# Patient Record
Sex: MALE | Race: BLACK OR AFRICAN AMERICAN | NOT HISPANIC OR LATINO | Employment: OTHER | URBAN - METROPOLITAN AREA
[De-identification: names, ages, dates, MRNs, and addresses within clinical notes are randomized per-mention and may not be internally consistent; named-entity substitution may affect disease eponyms.]

---

## 2017-04-04 ENCOUNTER — LAB VISIT (OUTPATIENT)
Dept: LAB | Facility: HOSPITAL | Age: 77
End: 2017-04-04
Attending: UROLOGY
Payer: MEDICARE

## 2017-04-04 DIAGNOSIS — Z85.46 HISTORY OF PROSTATE CANCER: ICD-10-CM

## 2017-04-04 PROCEDURE — 84153 ASSAY OF PSA TOTAL: CPT

## 2017-04-04 PROCEDURE — 36415 COLL VENOUS BLD VENIPUNCTURE: CPT | Mod: PO

## 2017-04-04 PROCEDURE — 84403 ASSAY OF TOTAL TESTOSTERONE: CPT

## 2017-04-05 LAB
COMPLEXED PSA SERPL-MCNC: 0.34 NG/ML
TESTOST SERPL-MCNC: 17 NG/DL

## 2017-04-11 ENCOUNTER — OFFICE VISIT (OUTPATIENT)
Dept: UROLOGY | Facility: CLINIC | Age: 77
End: 2017-04-11
Payer: MEDICARE

## 2017-04-11 VITALS
HEIGHT: 69 IN | SYSTOLIC BLOOD PRESSURE: 110 MMHG | DIASTOLIC BLOOD PRESSURE: 78 MMHG | HEART RATE: 62 BPM | BODY MASS INDEX: 34.87 KG/M2 | WEIGHT: 235.44 LBS

## 2017-04-11 DIAGNOSIS — C61 PROSTATE CANCER: Primary | ICD-10-CM

## 2017-04-11 DIAGNOSIS — R35.1 NOCTURIA MORE THAN TWICE PER NIGHT: ICD-10-CM

## 2017-04-11 DIAGNOSIS — N52.9 ED (ERECTILE DYSFUNCTION) OF ORGANIC ORIGIN: ICD-10-CM

## 2017-04-11 LAB
BILIRUB SERPL-MCNC: NORMAL MG/DL
BLOOD URINE, POC: NORMAL
COLOR, POC UA: YELLOW
GLUCOSE UR QL STRIP: NORMAL
KETONES UR QL STRIP: NORMAL
LEUKOCYTE ESTERASE URINE, POC: NORMAL
NITRITE, POC UA: NORMAL
PH, POC UA: 7
PROTEIN, POC: NORMAL
SPECIFIC GRAVITY, POC UA: 1000
UROBILINOGEN, POC UA: NORMAL

## 2017-04-11 PROCEDURE — 3078F DIAST BP <80 MM HG: CPT | Mod: S$GLB,,, | Performed by: UROLOGY

## 2017-04-11 PROCEDURE — 3074F SYST BP LT 130 MM HG: CPT | Mod: S$GLB,,, | Performed by: UROLOGY

## 2017-04-11 PROCEDURE — 99213 OFFICE O/P EST LOW 20 MIN: CPT | Mod: 25,S$GLB,, | Performed by: UROLOGY

## 2017-04-11 PROCEDURE — 99999 PR PBB SHADOW E&M-EST. PATIENT-LVL III: CPT | Mod: PBBFAC,,, | Performed by: UROLOGY

## 2017-04-11 PROCEDURE — 81001 URINALYSIS AUTO W/SCOPE: CPT | Mod: S$GLB,,, | Performed by: UROLOGY

## 2017-04-11 PROCEDURE — 1160F RVW MEDS BY RX/DR IN RCRD: CPT | Mod: S$GLB,,, | Performed by: UROLOGY

## 2017-04-11 PROCEDURE — 1159F MED LIST DOCD IN RCRD: CPT | Mod: S$GLB,,, | Performed by: UROLOGY

## 2017-04-11 PROCEDURE — 1126F AMNT PAIN NOTED NONE PRSNT: CPT | Mod: S$GLB,,, | Performed by: UROLOGY

## 2017-04-11 PROCEDURE — 1157F ADVNC CARE PLAN IN RCRD: CPT | Mod: S$GLB,,, | Performed by: UROLOGY

## 2017-04-11 NOTE — MR AVS SNAPSHOT
[facility]  [address]  [place] [address]  Phone: [phone]                  Cr Ferrell   2017 3:45 PM   Office Visit    Description:  Male : 1940   Provider:  DUARTE Holman MD   Department:  [facility]           Reason for Visit     Prostate Cancer           Diagnoses this Visit        Comments    Prostate cancer    -  Primary     Nocturia more than twice per night         ED (erectile dysfunction) of organic origin                To Do List           Future Appointments        Provider Department Dept Phone    2017 3:45 PM DUARTE Holman MD [facility] [phone]      Goals (5 Years of Data)              9/27/14    7/12/14    3/8/14    HEMOGLOBIN A1C < 7.0   9.0  7.1  7.7    Related Problems    Uncontrolled type 2 diabetes with stage 3 chronic kidney disease GFR 30-59      Follow-Up and Disposition     Return in about 3 months (around 2017) for Review PSA, Follow up, Possible Trelstar Injection.      [facility]     [facility] Nurse Care Line -  Assistance  Unless otherwise directed by your provider, please contact [facility], our nurse care line that is available for  assistance.     Registered nurses in the [facility] provide: appointment scheduling, clinical advisement, health education, and other advisory services.  Call: [phone] (toll free)               Medications           Message regarding Medications     Verify the changes and/or additions to your medication regime listed below are the same as discussed with your clinician today.  If any of these changes or additions are incorrect, please notify your healthcare provider.        STOP taking these medications     hydrocodone-acetaminophen 10-325mg (NORCO)  mg Tab Take 1 tablet by mouth 2 (two) times daily as needed.           Verify that the below list of medications is an accurate representation of the medications you are  "currently taking.  If none reported, the list may be blank. If incorrect, please contact your healthcare provider. Carry this list with you in case of emergency.           Current Medications     felodipine (PLENDIL) 10 MG 24 hr tablet Take 1 tablet (10 mg total) by mouth once daily.    gabapentin (NEURONTIN) 300 MG capsule take 1 capsule by mouth three times a day    glipiZIDE (GLUCOTROL) 10 MG tablet take 1 and 1/2 tablets by mouth twice a day before meals    hydrochlorothiazide (HYDRODIURIL) 25 MG tablet Take 1 tablet (25 mg total) by mouth once daily.    latanoprost 0.005 % ophthalmic solution     lisinopril (PRINIVIL,ZESTRIL) 40 MG tablet Take 1 tablet (40 mg total) by mouth once daily.    ranitidine (ZANTAC) 150 MG tablet Take 1 tablet (150 mg total) by mouth 2 (two) times daily.    simvastatin (ZOCOR) 80 MG tablet Take 1 tablet (80 mg total) by mouth every evening.    spironolactone (ALDACTONE) 50 MG tablet Take 1 tablet (50 mg total) by mouth 2 (two) times daily.    tamsulosin (FLOMAX) 0.4 mg Cp24 take 1 capsule by mouth once daily    triptorelin pamoate 11.25 mg SusR Inject 1 Syringe into the muscle every 3 (three) months.           Clinical Reference Information           Your Vitals Were     BP Pulse Height Weight BMI    110/78 62 5' 9" (1.753 m) 106.8 kg (235 lb 7.2 oz) 34.77 kg/m2      Blood Pressure          Most Recent Value    BP  110/78      Allergies as of 4/11/2017     No Known Allergies      Immunizations Administered on Date of Encounter - 4/11/2017     None      Orders Placed During Today's Visit      Normal Orders This Visit    POCT urinalysis, dipstick or tablet reag     Future Labs/Procedures Expected by Expires    Testosterone  4/11/2017 6/10/2018    Prostate Specific Antigen, Diagnostic  7/9/2017 4/11/2018      [facility] Sign-Up     Activating your [facility] account is as easy as 1-2-3!     1) Visit [email or web address], select Sign Up Now, enter this activation code and your date of birth, then " select Next.  [email or web address]  Expires: 5/26/2017  3:37 PM      2) Create a username and password to use when you visit [facility] in the future and select a security question in case you lose your password and select Next.    3) Enter your e-mail address and click Sign Up!    Additional Information  If you have questions, please e-mail Notify [email or web address] or call [phone] to talk to our [employer] staff. Remember, [facility] is NOT to be used for urgent needs. For medical emergencies, dial 911.         Language Assistance Services     ATTENTION: Language assistance services are available, free of charge. Please call [phone].      ATENCIÓN: Si prosper blum, tiene a pepper disposición servicios gratuitos de asistencia lingüística. Llame al [phone].     [facility] Ý: N?u b?n nói Ti?ng Vi?t, có các d?ch v? h? tr? ngôn ng? mi?n phí dành cho b?n. G?i s? [phone].         [facility] complies with applicable Federal civil rights laws and does not discriminate on the basis of race, color, national origin, age, disability, or sex.

## 2017-04-11 NOTE — PROGRESS NOTES
Subjective:       Patient ID: Cr Ferrell is a 76 y.o. male who was last seen in this office 12/20/2016    Chief Complaint:   Chief Complaint   Patient presents with    Prostate Cancer     3 month f/u with labs an trelstar         History of Present Illness  Prostate Cancer  He underwent brachytherapy in 2005.  His PSA was low for a few years but then started to rise.   In May 2014 his PSA had risen to 9.2 and he was restarted on the Zoladex.  He now gets intermittent injections.  His last Trelstar 22.5 mg injection was  September 2016.   He feels okay.  He denies hematuria, pain, or weight loss.  He has ED and is managed with either Viagra, Levitra, or Cilais.       ACTIVE MEDICAL ISSUES:  Patient Active Problem List   Diagnosis    Hypertension    Uncontrolled type 2 diabetes with stage 3 chronic kidney disease GFR 30-59    BPH (benign prostatic hyperplasia)    Hyperlipidemia LDL goal < 100    Obstructive sleep apnea on CPAP    Obesity     Chronic low back pain    Lumbar radiculopathy    Chronic kidney disease, stage 3, mod decreased GFR    Type 2 diabetes, uncontrolled, with retinopathy    AR (allergic rhinitis)    Uncontrolled type 2 diabetes mellitus with proteinuria or microalbuminuria       ALLERGIES AND MEDICATIONS: updated and reviewed.  Review of patient's allergies indicates:  No Known Allergies  Current Outpatient Prescriptions   Medication Sig    felodipine (PLENDIL) 10 MG 24 hr tablet Take 1 tablet (10 mg total) by mouth once daily.    gabapentin (NEURONTIN) 300 MG capsule take 1 capsule by mouth three times a day    glipiZIDE (GLUCOTROL) 10 MG tablet take 1 and 1/2 tablets by mouth twice a day before meals    hydrochlorothiazide (HYDRODIURIL) 25 MG tablet Take 1 tablet (25 mg total) by mouth once daily.    latanoprost 0.005 % ophthalmic solution     lisinopril (PRINIVIL,ZESTRIL) 40 MG tablet Take 1 tablet (40 mg total) by mouth once daily.    ranitidine (ZANTAC) 150 MG tablet Take 1  "tablet (150 mg total) by mouth 2 (two) times daily.    simvastatin (ZOCOR) 80 MG tablet Take 1 tablet (80 mg total) by mouth every evening.    spironolactone (ALDACTONE) 50 MG tablet Take 1 tablet (50 mg total) by mouth 2 (two) times daily.    tamsulosin (FLOMAX) 0.4 mg Cp24 take 1 capsule by mouth once daily    triptorelin pamoate 11.25 mg SusR Inject 1 Syringe into the muscle every 3 (three) months.     No current facility-administered medications for this visit.        Review of Systems   Constitutional: Negative for activity change, fatigue, fever and unexpected weight change.   HENT: Negative for congestion.    Eyes: Negative for redness.   Respiratory: Negative for chest tightness and shortness of breath.    Cardiovascular: Negative for chest pain and leg swelling.   Gastrointestinal: Negative for abdominal pain, constipation, diarrhea, nausea and vomiting.   Genitourinary: Negative for dysuria, flank pain, frequency, hematuria, penile pain, penile swelling, scrotal swelling, testicular pain and urgency.   Musculoskeletal: Negative for arthralgias and back pain.   Neurological: Negative for dizziness and light-headedness.   Psychiatric/Behavioral: Negative for behavioral problems and confusion. The patient is not nervous/anxious.    All other systems reviewed and are negative.      Objective:      Vitals:    04/11/17 1531   BP: 110/78   Pulse: 62   Weight: 106.8 kg (235 lb 7.2 oz)   Height: 5' 9" (1.753 m)     Physical Exam   Nursing note and vitals reviewed.  Constitutional: He is oriented to person, place, and time. He appears well-developed and well-nourished.   HENT:   Head: Normocephalic.   Eyes: Conjunctivae are normal.   Neck: Normal range of motion. Neck supple. No tracheal deviation present. No thyromegaly present.   Cardiovascular: Normal rate and normal heart sounds.    Pulmonary/Chest: Effort normal and breath sounds normal. No respiratory distress. He has no wheezes.   Abdominal: Soft. Bowel " sounds are normal. There is no hepatosplenomegaly. There is no tenderness. There is no rebound and no CVA tenderness. No hernia.   Musculoskeletal: Normal range of motion. He exhibits no edema or tenderness.   Lymphadenopathy:     He has no cervical adenopathy.   Neurological: He is alert and oriented to person, place, and time.   Skin: Skin is warm and dry. No rash noted. No erythema.     Psychiatric: He has a normal mood and affect. His behavior is normal. Judgment and thought content normal.       Urine dipstick shows negative for all components.  Micro exam: negative for WBC's or RBC's.      PSA DIAGNOSTIC 0.00 - 4.00 ng/mL 0.34   Comments: PSA Expected levels:   Hormonal Therapy: <0.05 ng/ml   Prostatectomy: <0.01 ng/ml   Radiation Therapy: <1.00 ng/ml      Resulting Agency  [facility]   Narrative   PSA 3 months      Specimen Collected: 04/04/17  3:00 PM Last Resulted: 04/05/17  3:07 AM          Assessment:       1. Prostate cancer    2. Nocturia more than twice per night    3. ED (erectile dysfunction) of organic origin          Plan:       1. Prostate cancer  No injection today    - POCT urinalysis, dipstick or tablet reag  - Prostate Specific Antigen, Diagnostic; Future  - Testosterone; Future    2. Nocturia more than twice per night  Limit evening fluids    3. ED (erectile dysfunction) of organic origin  PDE5i when needed            Return in about 3 months (around 7/11/2017) for Review PSA, Follow up, Possible Trelstar Injection.

## 2017-06-13 RX ORDER — GABAPENTIN 300 MG/1
CAPSULE ORAL
Qty: 270 CAPSULE | Refills: 3 | Status: SHIPPED | OUTPATIENT
Start: 2017-06-13

## 2017-07-17 ENCOUNTER — LAB VISIT (OUTPATIENT)
Dept: LAB | Facility: HOSPITAL | Age: 77
End: 2017-07-17
Attending: UROLOGY
Payer: MEDICARE

## 2017-07-17 DIAGNOSIS — C61 PROSTATE CANCER: ICD-10-CM

## 2017-07-17 LAB
COMPLEXED PSA SERPL-MCNC: 7 NG/ML
TESTOST SERPL-MCNC: 210 NG/DL

## 2017-07-17 PROCEDURE — 84403 ASSAY OF TOTAL TESTOSTERONE: CPT

## 2017-07-17 PROCEDURE — 36415 COLL VENOUS BLD VENIPUNCTURE: CPT | Mod: PO

## 2017-07-17 PROCEDURE — 84153 ASSAY OF PSA TOTAL: CPT

## 2017-07-18 DIAGNOSIS — C61 PROSTATE CANCER: Primary | ICD-10-CM

## 2017-07-20 ENCOUNTER — OFFICE VISIT (OUTPATIENT)
Dept: UROLOGY | Facility: CLINIC | Age: 77
End: 2017-07-20
Payer: MEDICARE

## 2017-07-20 VITALS
HEIGHT: 69 IN | BODY MASS INDEX: 36.08 KG/M2 | DIASTOLIC BLOOD PRESSURE: 62 MMHG | HEART RATE: 70 BPM | SYSTOLIC BLOOD PRESSURE: 122 MMHG | WEIGHT: 243.63 LBS

## 2017-07-20 DIAGNOSIS — R33.9 INCOMPLETE EMPTYING OF BLADDER: ICD-10-CM

## 2017-07-20 DIAGNOSIS — C61 PROSTATE CANCER: Primary | ICD-10-CM

## 2017-07-20 DIAGNOSIS — R35.1 NOCTURIA MORE THAN TWICE PER NIGHT: ICD-10-CM

## 2017-07-20 LAB
BILIRUB SERPL-MCNC: NORMAL MG/DL
BLOOD URINE, POC: NORMAL
COLOR, POC UA: YELLOW
GLUCOSE UR QL STRIP: POSITIVE
KETONES UR QL STRIP: NORMAL
LEUKOCYTE ESTERASE URINE, POC: NORMAL
NITRITE, POC UA: NORMAL
PH, POC UA: 6
PROTEIN, POC: NORMAL
SPECIFIC GRAVITY, POC UA: 1000
UROBILINOGEN, POC UA: NORMAL

## 2017-07-20 PROCEDURE — 96402 CHEMO HORMON ANTINEOPL SQ/IM: CPT | Mod: S$GLB,,, | Performed by: UROLOGY

## 2017-07-20 PROCEDURE — 1159F MED LIST DOCD IN RCRD: CPT | Mod: S$GLB,,, | Performed by: UROLOGY

## 2017-07-20 PROCEDURE — 81001 URINALYSIS AUTO W/SCOPE: CPT | Mod: S$GLB,,, | Performed by: UROLOGY

## 2017-07-20 PROCEDURE — 1126F AMNT PAIN NOTED NONE PRSNT: CPT | Mod: S$GLB,,, | Performed by: UROLOGY

## 2017-07-20 PROCEDURE — 99999 PR PBB SHADOW E&M-EST. PATIENT-LVL III: CPT | Mod: PBBFAC,,, | Performed by: UROLOGY

## 2017-07-20 PROCEDURE — 99214 OFFICE O/P EST MOD 30 MIN: CPT | Mod: 25,S$GLB,, | Performed by: UROLOGY

## 2017-07-20 NOTE — PROGRESS NOTES
Name, , and allergies verified. Patient verbalized understanding of why they are here today. Patient here for Trelstar Injection. Trelstar 22.5 mg given IM with no adverse effects. (See MAR)  Patient tolerated procedure well.  Patient was asked to wait an appropriate 15 minutes in ensure that no reaction occurred. Patient was discharged with no acute distress and was instructed to call the office if any questions or concerns arose.

## 2017-07-20 NOTE — PROGRESS NOTES
Subjective:       Patient ID: Cr Ferrell is a 77 y.o. male who was last seen in this office 4/11/2017    Chief Complaint:   Chief Complaint   Patient presents with    Prostate Cancer     f/u with labs an trelstar injection        History of Present Illness  Prostate Cancer  He underwent brachytherapy in 2005.  His PSA was low for a few years but then started to rise.   In May 2014 his PSA had risen to 9.2 and he was restarted on the Zoladex.  He now gets intermittent injections.  His last Trelstar 22.5 mg injection was  September 2016.   He feels okay.  He denies hematuria, pain, or weight loss.  He has ED and is managed with either Viagra, Levitra, or Cilais.         ACTIVE MEDICAL ISSUES:  Patient Active Problem List   Diagnosis    Hypertension    Uncontrolled type 2 diabetes with stage 3 chronic kidney disease GFR 30-59    BPH (benign prostatic hyperplasia)    Hyperlipidemia LDL goal < 100    Obstructive sleep apnea on CPAP    Obesity     Chronic low back pain    Lumbar radiculopathy    Chronic kidney disease, stage 3, mod decreased GFR    Type 2 diabetes, uncontrolled, with retinopathy    AR (allergic rhinitis)    Uncontrolled type 2 diabetes mellitus with proteinuria or microalbuminuria       ALLERGIES AND MEDICATIONS: updated and reviewed.  Review of patient's allergies indicates:  No Known Allergies  Current Outpatient Prescriptions   Medication Sig    felodipine (PLENDIL) 10 MG 24 hr tablet Take 1 tablet (10 mg total) by mouth once daily.    gabapentin (NEURONTIN) 300 MG capsule take 1 capsule by mouth three times a day    glipiZIDE (GLUCOTROL) 10 MG tablet take 1 and 1/2 tablets by mouth twice a day before meals    hydrochlorothiazide (HYDRODIURIL) 25 MG tablet Take 1 tablet (25 mg total) by mouth once daily.    latanoprost 0.005 % ophthalmic solution     lisinopril (PRINIVIL,ZESTRIL) 40 MG tablet Take 1 tablet (40 mg total) by mouth once daily.    ranitidine (ZANTAC) 150 MG tablet Take  "1 tablet (150 mg total) by mouth 2 (two) times daily.    simvastatin (ZOCOR) 80 MG tablet Take 1 tablet (80 mg total) by mouth every evening.    spironolactone (ALDACTONE) 50 MG tablet Take 1 tablet (50 mg total) by mouth 2 (two) times daily.    tamsulosin (FLOMAX) 0.4 mg Cp24 take 1 capsule by mouth once daily    triptorelin pamoate 11.25 mg SusR Inject 1 Syringe into the muscle every 3 (three) months.     Current Facility-Administered Medications   Medication    triptorelin pamoate Syrg 22.5 mg       Review of Systems   Constitutional: Negative for activity change, fatigue, fever and unexpected weight change.   HENT: Negative for congestion.    Eyes: Negative for redness.   Respiratory: Negative for chest tightness and shortness of breath.    Cardiovascular: Negative for chest pain and leg swelling.   Gastrointestinal: Negative for abdominal pain, constipation, diarrhea, nausea and vomiting.   Genitourinary: Negative for dysuria, flank pain, frequency, hematuria, penile pain, penile swelling, scrotal swelling, testicular pain and urgency.   Musculoskeletal: Negative for arthralgias and back pain.   Neurological: Negative for dizziness and light-headedness.   Psychiatric/Behavioral: Negative for behavioral problems and confusion. The patient is not nervous/anxious.    All other systems reviewed and are negative.      Objective:      Vitals:    07/20/17 1453   BP: 122/62   Pulse: 70   Weight: 110.5 kg (243 lb 9.7 oz)   Height: 5' 9" (1.753 m)     Physical Exam   Nursing note and vitals reviewed.  Constitutional: He is oriented to person, place, and time. He appears well-developed and well-nourished.   HENT:   Head: Normocephalic.   Eyes: Conjunctivae are normal.   Neck: Normal range of motion. Neck supple. No tracheal deviation present. No thyromegaly present.   Cardiovascular: Normal rate and normal heart sounds.    Pulmonary/Chest: Effort normal and breath sounds normal. No respiratory distress. He has no " wheezes.   Abdominal: Soft. Bowel sounds are normal. There is no hepatosplenomegaly. There is no tenderness. There is no rebound and no CVA tenderness. No hernia.   Musculoskeletal: Normal range of motion. He exhibits no edema or tenderness.   Lymphadenopathy:     He has no cervical adenopathy.   Neurological: He is alert and oriented to person, place, and time.   Skin: Skin is warm and dry. No rash noted. No erythema.     Psychiatric: He has a normal mood and affect. His behavior is normal. Judgment and thought content normal.       Urine dipstick shows negative for all components.  Micro exam: negative for WBC's or RBC's.     Ref Range & Units 3d ago   PSA DIAGNOSTIC 0.00 - 4.00 ng/mL 7.0    Comments: PSA Expected levels:   Hormonal Therapy: <0.05 ng/ml   Prostatectomy: <0.01 ng/ml   Radiation Therapy: <1.00 ng/ml      Testosterone, Total 195.0 - 1138.0 ng/dL 210   Resulting Agency  [facility]   Narrative     PSA 3 months      Specimen Collected: 07/17/17 14:24   Last Resulted: 07/17/17 18:37          Assessment:       1. Prostate cancer    2. Nocturia more than twice per night    3. Incomplete emptying of bladder          Plan:       1. Prostate cancer  Injection today    - Prostate Specific Antigen, Diagnostic; Future  - Testosterone; Future  - triptorelin pamoate Syrg 22.5 mg; Inject 22.5 mg into the muscle one time.    2. Nocturia more than twice per night  stable    3. Incomplete emptying of bladder  stable            No Follow-up on file.